# Patient Record
Sex: FEMALE | Race: WHITE | NOT HISPANIC OR LATINO
[De-identification: names, ages, dates, MRNs, and addresses within clinical notes are randomized per-mention and may not be internally consistent; named-entity substitution may affect disease eponyms.]

---

## 2019-06-30 ENCOUNTER — TRANSCRIPTION ENCOUNTER (OUTPATIENT)
Age: 74
End: 2019-06-30

## 2022-07-01 ENCOUNTER — APPOINTMENT (OUTPATIENT)
Dept: ORTHOPEDIC SURGERY | Facility: CLINIC | Age: 77
End: 2022-07-01

## 2022-07-01 DIAGNOSIS — E78.00 PURE HYPERCHOLESTEROLEMIA, UNSPECIFIED: ICD-10-CM

## 2022-07-01 PROBLEM — Z00.00 ENCOUNTER FOR PREVENTIVE HEALTH EXAMINATION: Status: ACTIVE | Noted: 2022-07-01

## 2022-07-01 PROCEDURE — 76881 US COMPL JOINT R-T W/IMG: CPT

## 2022-07-01 PROCEDURE — 20551 NJX 1 TENDON ORIGIN/INSJ: CPT | Mod: RT

## 2022-07-01 PROCEDURE — 76942 ECHO GUIDE FOR BIOPSY: CPT | Mod: LT

## 2022-07-01 PROCEDURE — J3490M: CUSTOM

## 2022-07-01 PROCEDURE — 99213 OFFICE O/P EST LOW 20 MIN: CPT | Mod: 25

## 2022-07-01 NOTE — PROCEDURE
[FreeTextEntry3] : Patient has tried OTC's including aspirin, Ibuprofen, Aleve, etc or prescription NSAIDS, and/or exercises at home and/or physical therapy without satisfactory response and the risks benefits, and alternatives have been discussed, and verbal consent was obtained.\par \par The risks, benefits and contents of the injection have been discussed.  Risks include but are not limited to allergic reaction, flare reaction, permanent white skin discoloration at the injection site and infection.  The patient understands the risks and agrees to having the injection.  All questions have been answered.\par \par An injection of the left plantar fascia insertion was performed. The indication for this procedure was pain and inflammation. The site was prepped with alcohol and sterile technique used. An injection of 1cc Lidocaine 1% , 1cc of Bupivacaine (Marcaine) 0.5% , and 1cc of 80mg Methylprednisolone (Depomedrol) was used. Patient tolerated procedure well. Patient was advised to call if redness, pain, or fever occur, apply ice for 15 minutes out of every hour for the next 12-24 hours as tolerated and patient was advised to rest the joint(s) for 3 days.\par \par Ultrasound guidance was indicated for this patient due to inflammation . Plantar fascia thickness measured 0.55 cm.  All ultrasound images have been permanently captured and stored accordingly in our picture archiving and communication system. Visualization of the needle and placement of injection was performed without complication.\par \par Patient has been advised to ice the plantar heel for multiple 20 minute intervals throughout the day. No sports, running, jumping or exercise activities should be done until re-evaluated. Only light stretching exercises are permitted.\par

## 2022-07-01 NOTE — HISTORY OF PRESENT ILLNESS
[de-identified] : Patient returns for her RT hallux rigidus and LT plantar fasciitis. Steroid injection for the right 1st mtp joint on 7/21/21  and left plantar fascia on 9/3/21 were very helpful.  She has been doing well.  Both have been increasing in pain since May, 2022.  No recent trauma. [FreeTextEntry1] : R foot

## 2022-07-01 NOTE — PHYSICAL EXAM
[Left] : left foot and ankle [Right] : right foot and ankle [1st] : 1st [NL (40)] : plantar flexion 40 degrees [NL 30)] : inversion 30 degrees [NL (20)] : eversion 20 degrees [5___] : eversion 5[unfilled]/5 [4___] : [facility] 4[unfilled]/5 [2+] : posterior tibialis pulse: 2+ [Normal] : saphenous nerve sensation normal [] : non-antalgic [TWNoteComboBox7] : dorsiflexion 15 degrees

## 2022-07-01 NOTE — ASSESSMENT
[FreeTextEntry1] : Steroid injections were discussed.  Patient has opted today to have one for the left foot.\par Ice/nsaids prn.  Supportive shoes encouraged.\par \par Patient would like to return next week for the injection for the right foot.

## 2022-07-08 ENCOUNTER — APPOINTMENT (OUTPATIENT)
Dept: ORTHOPEDIC SURGERY | Facility: CLINIC | Age: 77
End: 2022-07-08

## 2022-07-08 DIAGNOSIS — M20.21 HALLUX RIGIDUS, RIGHT FOOT: ICD-10-CM

## 2022-07-08 DIAGNOSIS — M72.2 PLANTAR FASCIAL FIBROMATOSIS: ICD-10-CM

## 2022-07-08 PROCEDURE — J3490M: CUSTOM

## 2022-07-08 PROCEDURE — 20604 DRAIN/INJ JOINT/BURSA W/US: CPT | Mod: RT

## 2022-07-08 PROCEDURE — 99212 OFFICE O/P EST SF 10 MIN: CPT | Mod: 25

## 2022-07-08 NOTE — HISTORY OF PRESENT ILLNESS
[de-identified] : Patient returns for her RT hallux rigidus and LT plantar fasciitis. Steroid injection for left plantar fascia on 7/1/22 with some relief. She presents today for right 1st MTP joint injection which helped her in 2021.  [FreeTextEntry1] : R foot

## 2022-07-08 NOTE — ASSESSMENT
[FreeTextEntry1] : WB in supportive footwear.\par Ice/nsaids prn.\par Recommend a course of PT for LT foot.\par Discussed role of steroid injection R 1st MTPJ and patient opts for this. \par

## 2022-07-08 NOTE — PROCEDURE
[FreeTextEntry3] : Patient has tried OTC's including aspirin, Ibuprofen, Aleve, etc or prescription NSAIDS, and/or exercises at home and/or physical therapy without satisfactory response and the risks benefits, and alternatives have been discussed, and verbal consent was obtained. \par \par The risks, benefits and contents of the injection have been discussed.  Risks include but are not limited to allergic reaction, flare reaction, permanent white skin discoloration at the injection site and infection.  The patient understands the risks and agrees to having the injection.  All questions have been answered.\par \par An injection of the right 1st MTPJ was performed. The indication for this procedure was pain and inflammation. The site was prepped with alcohol and sterile technique used. An injection of Lidocaine 1cc of 1% , Bupivacaine (Marcaine) 1cc of 0.5% , Methylprednisolone (Depomedrol) 1cc of 80 mg was used. Patient tolerated procedure well. Patient was advised to call if redness, pain or fever occur, apply ice for 15 minutes out of every hour for the next 12-24 hours as tolerated and patient was advised to rest the joint(s) for 3 days. \par \par Ultrasound guidance was indicated for this patient due to arthritic changes. All ultrasound images have been permanently captured and stored accordingly in our picture archiving and communication system. Visualization of the needle and placement of injection was performed without complication.\par

## 2022-07-08 NOTE — PHYSICAL EXAM
[Left] : left foot and ankle [Right] : right foot and ankle [1st] : 1st [NL (40)] : plantar flexion 40 degrees [NL 30)] : inversion 30 degrees [NL (20)] : eversion 20 degrees [5___] : eversion 5[unfilled]/5 [4___] : [facility] 4[unfilled]/5 [2+] : posterior tibialis pulse: 2+ [Normal] : saphenous nerve sensation normal [] : non-antalgic [de-identified] : eversion 15 degrees [TWNoteComboBox7] : dorsiflexion 15 degrees